# Patient Record
Sex: MALE | Race: WHITE | URBAN - METROPOLITAN AREA
[De-identification: names, ages, dates, MRNs, and addresses within clinical notes are randomized per-mention and may not be internally consistent; named-entity substitution may affect disease eponyms.]

---

## 2019-03-14 ENCOUNTER — MEDICAL CORRESPONDENCE (OUTPATIENT)
Dept: HEALTH INFORMATION MANAGEMENT | Facility: CLINIC | Age: 59
End: 2019-03-14

## 2019-04-26 ENCOUNTER — TRANSFERRED RECORDS (OUTPATIENT)
Dept: HEALTH INFORMATION MANAGEMENT | Facility: CLINIC | Age: 59
End: 2019-04-26

## 2019-04-26 ENCOUNTER — MEDICAL CORRESPONDENCE (OUTPATIENT)
Dept: HEALTH INFORMATION MANAGEMENT | Facility: CLINIC | Age: 59
End: 2019-04-26

## 2019-05-06 ENCOUNTER — DOCUMENTATION ONLY (OUTPATIENT)
Dept: CARE COORDINATION | Facility: CLINIC | Age: 59
End: 2019-05-06

## 2019-06-05 ENCOUNTER — OFFICE VISIT (OUTPATIENT)
Dept: NEUROLOGY | Facility: CLINIC | Age: 59
End: 2019-06-05
Payer: COMMERCIAL

## 2019-06-05 VITALS
BODY MASS INDEX: 21.59 KG/M2 | HEIGHT: 69 IN | DIASTOLIC BLOOD PRESSURE: 82 MMHG | HEART RATE: 71 BPM | SYSTOLIC BLOOD PRESSURE: 123 MMHG | OXYGEN SATURATION: 97 % | WEIGHT: 145.8 LBS

## 2019-06-05 DIAGNOSIS — G50.0 SUPRAORBITAL NEURALGIA: ICD-10-CM

## 2019-06-05 DIAGNOSIS — G43.709 CHRONIC MIGRAINE WITHOUT AURA WITHOUT STATUS MIGRAINOSUS, NOT INTRACTABLE: Primary | ICD-10-CM

## 2019-06-05 RX ORDER — NARATRIPTAN 2.5 MG/1
2.5 TABLET ORAL
Qty: 18 TABLET | Refills: 3 | Status: SHIPPED | OUTPATIENT
Start: 2019-06-05 | End: 2019-08-07

## 2019-06-05 RX ORDER — TENS UNITS AND TENS ELECTRODES
1 COMBINATION PACKAGE (EA) MISCELLANEOUS DAILY
Qty: 1 DEVICE | Refills: 0 | Status: SHIPPED | OUTPATIENT
Start: 2019-06-05

## 2019-06-05 RX ORDER — GABAPENTIN 300 MG/1
CAPSULE ORAL
Refills: 9 | COMMUNITY
Start: 2019-05-03

## 2019-06-05 RX ORDER — PROPRANOLOL HYDROCHLORIDE 20 MG/1
40 TABLET ORAL 2 TIMES DAILY
COMMUNITY

## 2019-06-05 RX ORDER — CETIRIZINE HYDROCHLORIDE 10 MG/1
10 TABLET ORAL DAILY PRN
COMMUNITY

## 2019-06-05 SDOH — HEALTH STABILITY: MENTAL HEALTH: HOW OFTEN DO YOU HAVE A DRINK CONTAINING ALCOHOL?: NEVER

## 2019-06-05 ASSESSMENT — PAIN SCALES - GENERAL: PAINLEVEL: NO PAIN (0)

## 2019-06-05 ASSESSMENT — MIFFLIN-ST. JEOR: SCORE: 1461.72

## 2019-06-05 NOTE — LETTER
6/5/2019       RE: Mika Olson  [address]  [facility] [address]     Dear Colleague,    Thank you for referring your patient, Mika Olson, to the [facility] NEUROLOGY at [facility]. Please see a copy of my visit note below.    [facility]   Clinics and Surgery Center  Neurology Consult     Mika Olson MRN# [id number]   YOB: 1960 Age: 59 year old     Requesting physician: Etienne Palumbo MD         Assessment and Recommendations:   Mika Olson is a 59 year old male who presents to the neurology clinic for further evaluation of headaches.    His headache presentation meets criteria for chronic migraine with possible sensory aura.  Interestingly, his head pain started after head trauma as a teenager, is described as tingling over the left frontal area, and is reproducible on exam with palpation over the supraorbital notch.  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    I did not recommend any further work-up for headaches today.  Going forward, we discussed the addition of supraorbital nerve block on the left, Cefaly antimigraine device, botulinum toxin injections, or a CGRP inhibitor.    -I provided a prescription for Cefaly device today.  -For acute treatment of headache, I offered a trial of another triptan, naratriptan, to see if he could get longer lasting benefit with a different triptan medication.  Prescription was provided today.    He will continue with a combination of propranolol, fluoxetine, gabapentin, as this has been beneficial in reducing his daily headaches to 4 headache days a month.  In the future, other options may become available to him, if needed, such as botulinum toxin injections or a CGRP inhibitor.    I spent 45 minutes with the patient, over half of which was counseling.  I will plan to see him back in 2 months to monitor his progress.    Nathaly  "MD Brandon  Neurology  Pager: [phone]            Chief Complaint:   Chief Complaint   Patient presents with     Headache     [facility] NEW - CHRONIC MIGRAINE, BOTOX CONSULT           History is obtained from the patient and medical record.      Mika Olson is a 59 year old male who is been suffering from headaches since his 20s.  He recalls a head injury affecting his left frontal area as a teenager, and has had pain over that area ever since.  He reports that his headaches have been progressively worsening over a long period of time.    His headaches are bifrontal, affecting the left side of his head more than the right, approximately 80% of the time.  His headache pain is difficult for him to describe, but it is like his head is being \"beaten like a drum\", \"pulses\", and \"overwhelms me\".  Averages a 4 or 5 out of 10 on the pain scale and last between 4 and 6 hours on average.  He estimates he has 4 headache days a month, with treatments including propranolol, fluoxetine, and gabapentin.  Without treatment, he has 30 out of 30 headache days a month.    His headaches are associated with photophobia, phonophobia, and nausea.  He has rare vomiting.  He describes feeling tingling and warmth 45 minutes to 60 minutes prior to the onset of headache.  He denies any positional component to his pain.  He denies autonomic features.  He denies fevers or other illness associated with headache.    For treatment of headache, he currently uses ice or Biofreeze.  He tried sinus Sudafed which was not effective.  He has tried sumatriptan, which was somewhat helpful, and rizatriptan, which was helpful for 4 hours.    For headache prevention, he is tried propranolol 40 mg twice a day, which was somewhat helpful for headache.  He takes fluoxetine 40 mg twice a day, which is helpful for headache.  He also takes gabapentin 300 mg in the morning and 1200 mg at night, which is somewhat helpful.  If he does not take all 3 of these " medications, his headaches become daily.    He tells me that it is his preference to avoid all prescription medications.            Past Medical History:   depression - duloxetine  Shingles - post herpetic neuralgia in right leg, tx w gabapentin          Past Surgical History:   Hernia repair x 2          Social History:   He is single and works in customer service.  He smokes a cigar or pipe occasionally.  He denies alcohol and drug use.  He drinks 2 cups of coffee daily on average.  Social History     Socioeconomic History     Marital status: Single     Spouse name: Not on file     Number of children: Not on file     Years of education: Not on file     Highest education level: Not on file   Occupational History     Not on file   Social Needs     Financial resource strain: Not on file     Food insecurity:     Worry: Not on file     Inability: Not on file     Transportation needs:     Medical: Not on file     Non-medical: Not on file   Tobacco Use     Smoking status: Current Some Day Smoker     Types: Pipe, Cigars     Smokeless tobacco: Former User   Substance and Sexual Activity     Alcohol use: Never     Frequency: Never     Drug use: Never     Sexual activity: Not on file   Lifestyle     Physical activity:     Days per week: Not on file     Minutes per session: Not on file     Stress: Not on file   Relationships     Social connections:     Talks on phone: Not on file     Gets together: Not on file     Attends [employer] service: Not on file     Active member of club or organization: Not on file     Attends meetings of clubs or organizations: Not on file     Relationship status: Not on file     Intimate partner violence:     Fear of current or ex partner: Not on file     Emotionally abused: Not on file     Physically abused: Not on file     Forced sexual activity: Not on file   Other Topics Concern     Not on file   Social History Narrative     Not on file             Family History:   There is a family history of  "head and face pain in his grandfather who had trigeminal neuralgia, and in some other members of his father's side of the family.  There is no family history of headache on his mother side.          Allergies:      Allergies   Allergen Reactions     No Clinical Screening - See Comments      Acetaminophen (Tylenol) - would like to avoid taking medications  Opiates - would like to avoid taking medications             Medications:     Current Outpatient Medications:      cetirizine (ZYRTEC) 10 MG tablet, Take 10 mg by mouth daily as needed for allergies, Disp: , Rfl:      FLUoxetine (PROZAC) 20 MG capsule, Take 40 mg by mouth 2 times daily, Disp: , Rfl:      gabapentin (NEURONTIN) 300 MG capsule, TAKE 1 CAPSULE BY MOUTH EVERY MORNING AND 4 CAPSULES BY MOUTH EVER NIGHT, Disp: , Rfl: 9     naratriptan (AMERGE) 2.5 MG tablet, Take 1 tablet (2.5 mg) by mouth at onset of headache for migraine (repeat dose after 4 hours if needed), Disp: 18 tablet, Rfl: 3     Nerve Stimulator (CEFALY KIT) [place], 1 Device daily 20 minutes nightly and 60 minutes as needed for headache, Disp: 1 Device, Rfl: 0     propranolol (INDERAL) 20 MG tablet, Take 40 mg by mouth 2 times daily, Disp: , Rfl:           Review of Systems:   Complete review of systems is negative except as noted in HPI.         Physical Exam:   /82 (BP Location: Left arm, Patient Position: Sitting, Cuff Size: Adult Regular)   Pulse 71   Ht 1.753 m (5' 9\")   Wt 66.1 kg (145 lb 12.8 oz)   SpO2 97%   BMI 21.53 kg/m      Physical Exam:   General: NAD  Neurologic:   Mental Status Exam: Alert, awake and oriented to situation. No dysarthria. Speech of normal fluency.   Cranial Nerves: Fundoscopic exam with clear disc margins bilaterally. PERRLA, EOMs intact, no nystagmus, facial movements symmetric, facial sensation intact to light touch, hearing intact to conversation, trapezius and SCMs 5/5 bilaterally, tongue midline and fully mobile. No tongue atrophy or fasciculations. "    Motor: Normal tone in all four extremities, no atrophy or fasciculations. 5/5 strength bilaterally in shoulder abduction, elbow extensors and flexors, wrist extensors and flexors, hip flexors, knee extensors and flexors, dorsi- and plantarflexion. No tremors or abnormal movements noted.   Sensory: Sensation intact to light touch on arms and legs bilaterally.    Coordination: Finger-nose-finger intact bilaterally.  Rapidly alternating movements intact bilaterally in the upper extremities.  Normal finger tapping bilaterally.  Normal Romberg.   Reflexes: 2+ and symmetric in triceps, biceps, brachioradialis, patellar, Achilles, and plantars downgoing bilaterally.   Gait: Normal gait.  Able to toe and heel walk.  Tandem gait normal.  Head: Normocephalic, atraumatic. No radiating pain with palpation over occipital nerves.  Temporal pulses intact.  There is radiating pain with palpation over the left supraorbital notch.  Neck: Normal range of motion with lateral head movements and neck flexion.  Eyes: No conjunctival injection, no scleral icterus.   Respiratory: No increased work of breathing.  Cardiovascular: No lower extremity edema.  Extremities: Warm, dry.          Data:     No head imaging immediately available for review today.  He reports normal head imaging between 10 and 12 years ago.    Again, thank you for allowing me to participate in the care of your patient.      Sincerely,    Nathaly Taylor MD

## 2019-06-05 NOTE — PROGRESS NOTES
[facility]  Neurology Consult     Mika Olson MRN# [id number]   YOB: 1960 Age: 59 year old     Requesting physician: Etienne Palumbo MD         Assessment and Recommendations:   Mika Olson is a 59 year old male who presents to the neurology clinic for further evaluation of headaches.    His headache presentation meets criteria for chronic migraine with possible sensory aura.  Interestingly, his head pain started after head trauma as a teenager, is described as tingling over the left frontal area, and is reproducible on exam with palpation over the supraorbital notch.  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    I did not recommend any further work-up for headaches today.  Going forward, we discussed the addition of supraorbital nerve block on the left, Cefaly antimigraine device, botulinum toxin injections, or a CGRP inhibitor.    -I provided a prescription for Cefaly device today.  -For acute treatment of headache, I offered a trial of another triptan, naratriptan, to see if he could get longer lasting benefit with a different triptan medication.  Prescription was provided today.    He will continue with a combination of propranolol, fluoxetine, gabapentin, as this has been beneficial in reducing his daily headaches to 4 headache days a month.  In the future, other options may become available to him, if needed, such as botulinum toxin injections or a CGRP inhibitor.    I spent 45 minutes with the patient, over half of which was counseling.  I will plan to see him back in 2 months to monitor his progress.    Nathaly Taylor MD  Neurology  Pager: [phone]            Chief Complaint:   Chief Complaint   Patient presents with     Headache     [facility] NEW - CHRONIC MIGRAINE, BOTOX CONSULT           History is obtained from the patient and medical record.      Mika Olson is a 59 year old male who is been  "suffering from headaches since his 20s.  He recalls a head injury affecting his left frontal area as a teenager, and has had pain over that area ever since.  He reports that his headaches have been progressively worsening over a long period of time.    His headaches are bifrontal, affecting the left side of his head more than the right, approximately 80% of the time.  His headache pain is difficult for him to describe, but it is like his head is being \"beaten like a drum\", \"pulses\", and \"overwhelms me\".  Averages a 4 or 5 out of 10 on the pain scale and last between 4 and 6 hours on average.  He estimates he has 4 headache days a month, with treatments including propranolol, fluoxetine, and gabapentin.  Without treatment, he has 30 out of 30 headache days a month.    His headaches are associated with photophobia, phonophobia, and nausea.  He has rare vomiting.  He describes feeling tingling and warmth 45 minutes to 60 minutes prior to the onset of headache.  He denies any positional component to his pain.  He denies autonomic features.  He denies fevers or other illness associated with headache.    For treatment of headache, he currently uses ice or Biofreeze.  He tried sinus Sudafed which was not effective.  He has tried sumatriptan, which was somewhat helpful, and rizatriptan, which was helpful for 4 hours.    For headache prevention, he is tried propranolol 40 mg twice a day, which was somewhat helpful for headache.  He takes fluoxetine 40 mg twice a day, which is helpful for headache.  He also takes gabapentin 300 mg in the morning and 1200 mg at night, which is somewhat helpful.  If he does not take all 3 of these medications, his headaches become daily.    He tells me that it is his preference to avoid all prescription medications.            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  He smokes a cigar or pipe occasionally.  He denies alcohol and drug use.  He drinks 2 cups of coffee daily on average.  Social History     Socioeconomic History     Marital status: Single     Spouse name: Not on file     Number of children: Not on file     Years of education: Not on file     Highest education level: Not on file   Occupational History     Not on file   Social Needs     Financial resource strain: Not on file     Food insecurity:     Worry: Not on file     Inability: Not on file     Transportation needs:     Medical: Not on file     Non-medical: Not on file   Tobacco Use     Smoking status: Current Some Day Smoker     Types: Pipe, Cigars     Smokeless tobacco: Former User   Substance and Sexual Activity     Alcohol use: Never     Frequency: Never     Drug use: Never     Sexual activity: Not on file   Lifestyle     Physical activity:     Days per week: Not on file     Minutes per session: Not on file     Stress: Not on file   Relationships     Social connections:     Talks on phone: Not on file     Gets together: Not on file     Attends Christian service: Not on file     Active member of club or organization: Not on file     Attends meetings of clubs or organizations: Not on file     Relationship status: Not on file     Intimate partner violence:     Fear of current or ex partner: Not on file     Emotionally abused: Not on file     Physically abused: Not on file     Forced sexual activity: Not on file   Other Topics Concern     Not on file   Social History Narrative     Not on file             Family History:   There is a family history of head and face pain in his grandfather who had trigeminal neuralgia, and in some other members of his father's side of the family.  There is no family history of headache on his mother side.          Allergies:      Allergies   Allergen Reactions     No Clinical Screening - See Comments       "Acetaminophen (Tylenol) - would like to avoid taking medications  Opiates - would like to avoid taking medications             Medications:     Current Outpatient Medications:      cetirizine (ZYRTEC) 10 MG tablet, Take 10 mg by mouth daily as needed for allergies, Disp: , Rfl:      FLUoxetine (PROZAC) 20 MG capsule, Take 40 mg by mouth 2 times daily, Disp: , Rfl:      gabapentin (NEURONTIN) 300 MG capsule, TAKE 1 CAPSULE BY MOUTH EVERY MORNING AND 4 CAPSULES BY MOUTH EVER NIGHT, Disp: , Rfl: 9     naratriptan (AMERGE) 2.5 MG tablet, Take 1 tablet (2.5 mg) by mouth at onset of headache for migraine (repeat dose after 4 hours if needed), Disp: 18 tablet, Rfl: 3     Nerve Stimulator (CEFALY KIT) [place], 1 Device daily 20 minutes nightly and 60 minutes as needed for headache, Disp: 1 Device, Rfl: 0     propranolol (INDERAL) 20 MG tablet, Take 40 mg by mouth 2 times daily, Disp: , Rfl:           Review of Systems:   Complete review of systems is negative except as noted in HPI.         Physical Exam:   /82 (BP Location: Left arm, Patient Position: Sitting, Cuff Size: Adult Regular)   Pulse 71   Ht 1.753 m (5' 9\")   Wt 66.1 kg (145 lb 12.8 oz)   SpO2 97%   BMI 21.53 kg/m     Physical Exam:   General: NAD  Neurologic:   Mental Status Exam: Alert, awake and oriented to situation. No dysarthria. Speech of normal fluency.   Cranial Nerves: Fundoscopic exam with clear disc margins bilaterally. PERRLA, EOMs intact, no nystagmus, facial movements symmetric, facial sensation intact to light touch, hearing intact to conversation, trapezius and SCMs 5/5 bilaterally, tongue midline and fully mobile. No tongue atrophy or fasciculations.    Motor: Normal tone in all four extremities, no atrophy or fasciculations. 5/5 strength bilaterally in shoulder abduction, elbow extensors and flexors, wrist extensors and flexors, hip flexors, knee extensors and flexors, dorsi- and plantarflexion. No tremors or abnormal movements " noted.   Sensory: Sensation intact to light touch on arms and legs bilaterally.    Coordination: Finger-nose-finger intact bilaterally.  Rapidly alternating movements intact bilaterally in the upper extremities.  Normal finger tapping bilaterally.  Normal Romberg.   Reflexes: 2+ and symmetric in triceps, biceps, brachioradialis, patellar, Achilles, and plantars downgoing bilaterally.   Gait: Normal gait.  Able to toe and heel walk.  Tandem gait normal.  Head: Normocephalic, atraumatic. No radiating pain with palpation over occipital nerves.  Temporal pulses intact.  There is radiating pain with palpation over the left supraorbital notch.  Neck: Normal range of motion with lateral head movements and neck flexion.  Eyes: No conjunctival injection, no scleral icterus.   Respiratory: No increased work of breathing.  Cardiovascular: No lower extremity edema.  Extremities: Warm, dry.          Data:     No head imaging immediately available for review today.  He reports normal head imaging between 10 and 12 years ago.

## 2019-08-07 ENCOUNTER — OFFICE VISIT (OUTPATIENT)
Dept: NEUROLOGY | Facility: CLINIC | Age: 59
End: 2019-08-07

## 2019-08-07 VITALS
WEIGHT: 141.6 LBS | OXYGEN SATURATION: 97 % | SYSTOLIC BLOOD PRESSURE: 118 MMHG | HEART RATE: 67 BPM | DIASTOLIC BLOOD PRESSURE: 78 MMHG | BODY MASS INDEX: 20.91 KG/M2

## 2019-08-07 DIAGNOSIS — G43.709 CHRONIC MIGRAINE WITHOUT AURA WITHOUT STATUS MIGRAINOSUS, NOT INTRACTABLE: ICD-10-CM

## 2019-08-07 DIAGNOSIS — G50.0 SUPRAORBITAL NEURALGIA: Primary | ICD-10-CM

## 2019-08-07 RX ORDER — NARATRIPTAN 2.5 MG/1
2.5 TABLET ORAL
Qty: 18 TABLET | Refills: 11 | Status: SHIPPED | OUTPATIENT
Start: 2019-08-07

## 2019-08-07 ASSESSMENT — PAIN SCALES - GENERAL: PAINLEVEL: NO PAIN (0)

## 2019-08-07 NOTE — NURSING NOTE
Chief Complaint   Patient presents with     RECHECK     [facility] RETURN - FOLLOW UP       Preventive Care:    Colorectal Cancer Screening: During our visit today, we discussed that it is recommended you receive colorectal cancer screening. Please call or make an appointment with your primary care provider to discuss this. You may also call the [facility] scheduling line ([phone]) to set up a colonoscopy appointment.      Nuno Mao, EMT

## 2019-08-07 NOTE — PROGRESS NOTES
"[facility]  Neurology Progress Note    Subjective:    Mr. Olson returns to the neurology clinic for follow-up of chronic migraine with possible sensory aura and left supraorbital neuralgia.    I last saw him on June 5, 2019, which we discussed his headache diagnosis and came up with a symptomatic treatment strategy, with both acute and preventative options.  We switched his triptan to naratriptan to get longer lasting benefit, and I suggested a trial of Cefaly antimigraine device as a nonmedication option for prevention of headache.    Since I last saw him, he reports he has continued to have headaches 23% of the time, and would have headaches 30 out of 30 days a month if he did not take a combination of propranolol 40 mg twice a day, fluoxetine 40 mg twice a day, and gabapentin 300 mg in the morning and 1200 mg at night.  He decided not to move forward with a trial of Cefaly, given the out-of-pocket cost.    He trialed naratriptan, which he explains \"works beautifully\".  He denies any side effects from naratriptan and desires to continue this.    He continues to have some difficulties with his 3 drug treatment regimen, but changes to this 3 drug treatment regimen have resulted in daily headaches.    Otherwise, he reports that he had an annual checkup with his primary care physician, and got the shingles shot, and felt hot and cold afterward.  He reports he has had shingles 3 or 4 times in the past, including once affecting his leg, which resulted in postherpetic neuralgia.    Objective:    Vitals: /78 (BP Location: Left arm, Patient Position: Sitting, Cuff Size: Adult Regular)   Pulse 67   Wt 64.2 kg (141 lb 9.6 oz)   SpO2 97%   BMI 20.91 kg/m    General: Cooperative, NAD  Head: Atraumatic, there is radiating pain with palpation over the left supraorbital notch.  Neck: Normal range of motion  Cardiac: no lower extremity " edema  Neurologic:  Mental Status: Fully alert, attentive and oriented. Speech clear and fluent.   Cranial Nerves: Facial movements symmetric.   Motor: No abnormal movements.  Moving all extremities.    Station/Gait: Normal casual gait.     Assessment/Plan:   Mika Olson is a 59-year-old man who returns to the neurology clinic for follow-up of chronic migraine with possible sensory aura and left supraorbital neuralgia.    His headaches have continued in a similar manner compared to his last visit, and he likes the addition of naratriptan for acute treatment.  Going forward, we discussed getting preauthorization for botulinum toxin injections, to see if he can get similar benefit more benefit with less use of prescription medications.  He does have a history of depression, and coming off of propranolol may be beneficial.  He is also concerned about mood altering effects of gabapentin.  -For acute treatment of headache, he will continue naratriptan 2.5 mg taken at the onset of headache, with a repeat dose in 4 hours if needed.  This should not exceed more than 9 days/month to avoid medication overuse.  -Preventative treatment of headache, he will continue his current regimen of propranolol 40 mg twice a day, fluoxetine 40 mg twice a day, and gabapentin 300 mg in the morning and 1200 mg at night, as we get preauthorization for botulinum toxin injections.  -We will plan to see him back for his first set of injections.  I recommend 3 sets of injections prior to determining effectiveness.  -If his left-sided head pain were to flare, a left supraorbital nerve block could be considered in the future.    I spent 15 minutes with the patient, over half of which was counseling.    Nathaly Taylor MD  Neurology   Pager [phone]

## 2019-08-07 NOTE — LETTER
"8/7/2019       RE: Mika Olson  [address]  [facility] [address]     Dear Colleague,    Thank you for referring your patient, Mika Olson, to the [facility] NEUROLOGY at [facility]. Please see a copy of my visit note below.    [facility]    [facility]  Neurology Progress Note    Subjective:    Mr. Olson returns to the neurology clinic for follow-up of chronic migraine with possible sensory aura and left supraorbital neuralgia.    I last saw him on June 5, 2019, which we discussed his headache diagnosis and came up with a symptomatic treatment strategy, with both acute and preventative options.  We switched his triptan to naratriptan to get longer lasting benefit, and I suggested a trial of Cefaly antimigraine device as a nonmedication option for prevention of headache.    Since I last saw him, he reports he has continued to have headaches 23% of the time, and would have headaches 30 out of 30 days a month if he did not take a combination of propranolol 40 mg twice a day, fluoxetine 40 mg twice a day, and gabapentin 300 mg in the morning and 1200 mg at night.  He decided not to move forward with a trial of Cefaly, given the out-of-pocket cost.    He trialed naratriptan, which he explains \"works beautifully\".  He denies any side effects from naratriptan and desires to continue this.    He continues to have some difficulties with his 3 drug treatment regimen, but changes to this 3 drug treatment regimen have resulted in daily headaches.    Otherwise, he reports that he had an annual checkup with his primary care physician, and got the shingles shot, and felt hot and cold afterward.  He reports he has had shingles 3 or 4 times in the past, including once affecting his leg, which resulted in postherpetic neuralgia.    Objective:    Vitals: /78 (BP Location: Left arm, Patient Position: Sitting, Cuff Size: " Adult Regular)   Pulse 67   Wt 64.2 kg (141 lb 9.6 oz)   SpO2 97%   BMI 20.91 kg/m     General: Cooperative, NAD  Head: Atraumatic, there is radiating pain with palpation over the left supraorbital notch.  Neck: Normal range of motion  Cardiac: no lower extremity edema  Neurologic:  Mental Status: Fully alert, attentive and oriented. Speech clear and fluent.   Cranial Nerves: Facial movements symmetric.   Motor: No abnormal movements.  Moving all extremities.    Station/Gait: Normal casual gait.     Assessment/Plan:   Mika Olson is a 59-year-old man who returns to the neurology clinic for follow-up of chronic migraine with possible sensory aura and left supraorbital neuralgia.    His headaches have continued in a similar manner compared to his last visit, and he likes the addition of naratriptan for acute treatment.  Going forward, we discussed getting preauthorization for botulinum toxin injections, to see if he can get similar benefit more benefit with less use of prescription medications.  He does have a history of depression, and coming off of propranolol may be beneficial.  He is also concerned about mood altering effects of gabapentin.  -For acute treatment of headache, he will continue naratriptan 2.5 mg taken at the onset of headache, with a repeat dose in 4 hours if needed.  This should not exceed more than 9 days/month to avoid medication overuse.  -Preventative treatment of headache, he will continue his current regimen of propranolol 40 mg twice a day, fluoxetine 40 mg twice a day, and gabapentin 300 mg in the morning and 1200 mg at night, as we get preauthorization for botulinum toxin injections.  -We will plan to see him back for his first set of injections.  I recommend 3 sets of injections prior to determining effectiveness.  -If his left-sided head pain were to flare, a left supraorbital nerve block could be considered in the future.    I spent 15 minutes with the patient, over half of  which was counseling.    Nathaly Taylor MD  Neurology   Pager [phone]